# Patient Record
Sex: MALE | Race: OTHER | HISPANIC OR LATINO | Employment: UNEMPLOYED | URBAN - METROPOLITAN AREA
[De-identification: names, ages, dates, MRNs, and addresses within clinical notes are randomized per-mention and may not be internally consistent; named-entity substitution may affect disease eponyms.]

---
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
## 2022-03-31 ENCOUNTER — APPOINTMENT (OUTPATIENT)
Dept: URGENT CARE | Age: 22
End: 2022-03-31

## 2022-08-27 ENCOUNTER — NURSE TRIAGE (OUTPATIENT)
Dept: OTHER | Facility: OTHER | Age: 22
End: 2022-08-27

## 2022-08-27 NOTE — TELEPHONE ENCOUNTER
Symptoms onset8/24: Sore throat, vomiting  Headache   Denies SOB/breathing difficulties  Denies Chest pain  Seeking care/treatment advise  Care advice given  Informed to call back if worsening symptoms  Verbalized understanding and agreeable with disposition   No further questions

## 2022-08-27 NOTE — TELEPHONE ENCOUNTER
Reason for Disposition   [1] COVID-19 infection suspected by caller or triager AND [2] mild symptoms (cough, fever, or others) AND [3] has not gotten tested yet    Answer Assessment - Initial Assessment Questions  Were you within 6 feet or less, for up to 15 minutes or more with a person that has a confirmed COVID-19 test?                   What was the date of your exposure? Are you experiencing any symptoms attributed to the virus?  (Assess for SOB, cough, fever, difficulty breathing)            8/24: Sore throat, vomiting   Headache        HIGH RISK: Do you have any history heart or lung conditions, weakened immune system, diabetes, Asthma, CHF, HIV, COPD, Chemo, renal failure, sickle cell, etc?     Denies    Protocols used: CORONAVIRUS (COVID-19) DIAGNOSED OR SUSPECTED-[facility]

## 2022-08-30 NOTE — TELEPHONE ENCOUNTER
This patient is already established and could have been scheduled on the same day schedule for this complaint      LM to call back to schedule

## 2024-02-21 PROBLEM — R05.9 COUGH: Status: RESOLVED | Noted: 2020-11-05 | Resolved: 2024-02-21

## 2025-02-19 ENCOUNTER — OFFICE VISIT (OUTPATIENT)
Dept: DENTISTRY | Facility: CLINIC | Age: 25
End: 2025-02-19

## 2025-02-19 ENCOUNTER — TELEPHONE (OUTPATIENT)
Dept: DENTISTRY | Facility: CLINIC | Age: 25
End: 2025-02-19

## 2025-02-19 VITALS — DIASTOLIC BLOOD PRESSURE: 77 MMHG | HEART RATE: 69 BPM | SYSTOLIC BLOOD PRESSURE: 121 MMHG

## 2025-02-19 DIAGNOSIS — Z01.21 ENCOUNTER FOR DENTAL EXAMINATION AND CLEANING WITH ABNORMAL FINDINGS: Primary | ICD-10-CM

## 2025-02-19 PROCEDURE — D0140 LIMITED ORAL EVALUATION - PROBLEM FOCUSED: HCPCS | Performed by: DENTIST

## 2025-02-19 PROCEDURE — D0274 BITEWINGS - 4 RADIOGRAPHIC IMAGES: HCPCS | Performed by: DENTIST

## 2025-02-19 PROCEDURE — D0330 PANORAMIC RADIOGRAPHIC IMAGE: HCPCS | Performed by: DENTIST

## 2025-02-19 RX ORDER — IBUPROFEN 400 MG/1
400 TABLET, FILM COATED ORAL EVERY 6 HOURS PRN
Qty: 30 TABLET | Refills: 0 | Status: SHIPPED | OUTPATIENT
Start: 2025-02-19

## 2025-02-19 RX ORDER — ACETAMINOPHEN 325 MG/1
325 TABLET ORAL EVERY 6 HOURS PRN
Qty: 30 TABLET | Refills: 0 | Status: SHIPPED | OUTPATIENT
Start: 2025-02-19

## 2025-02-19 NOTE — PROGRESS NOTES
Pt. Presented as an emergency complaining from pain severe at the time specially when eating at the lower left last molar. Since some time.    Reviewing [facility] ; No allergy  ASA : II  Pain Level : 1 ---> 7 especially when eating or drinking liquids.    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 Other early to moderate carries existed on molars .but no swelling or discharging fistula noticed., teeth mobility is WNL. Same as the soft tissues in the oral cavity.occlusion is acceptable and fair oral hygiene noticed.    Problem and solutions explained to pt.he preferred to get the left wisdom teeth extracted now then he will think about the other side.    Pt. Referred to [facility] for extraction of # 17,16 ONLY.  Rx. To his pharmacy of choice : Tylenol 350 mg caps and Motrin 400 mg tab ( Thirty tab each  W/O refills ) .  Referral given to [facility].    Pt. Understood and left satisfied.    NV : Comp exam

## 2025-02-19 NOTE — TELEPHONE ENCOUNTER
Called pt regarding [facility] referral. Spoke with pt. Pt stated he will schedule his own appointment. Asked pt to call back with appointment info.